# Patient Record
Sex: MALE | URBAN - METROPOLITAN AREA
[De-identification: names, ages, dates, MRNs, and addresses within clinical notes are randomized per-mention and may not be internally consistent; named-entity substitution may affect disease eponyms.]

---

## 2024-09-02 ENCOUNTER — HOSPITAL ENCOUNTER (EMERGENCY)
Facility: HOSPITAL | Age: 54
Discharge: HOME | End: 2024-09-02
Attending: EMERGENCY MEDICINE
Payer: COMMERCIAL

## 2024-09-02 VITALS
RESPIRATION RATE: 16 BRPM | WEIGHT: 165 LBS | HEART RATE: 77 BPM | HEIGHT: 68 IN | SYSTOLIC BLOOD PRESSURE: 131 MMHG | OXYGEN SATURATION: 98 % | DIASTOLIC BLOOD PRESSURE: 73 MMHG | TEMPERATURE: 99 F | BODY MASS INDEX: 25.01 KG/M2

## 2024-09-02 DIAGNOSIS — H61.23 BILATERAL IMPACTED CERUMEN: Primary | ICD-10-CM

## 2024-09-02 PROCEDURE — 25000003 PHARM REV CODE 250: Performed by: NURSE PRACTITIONER

## 2024-09-02 PROCEDURE — 99282 EMERGENCY DEPT VISIT SF MDM: CPT

## 2024-09-02 RX ORDER — DOCUSATE SODIUM 50 MG/5ML
100 LIQUID ORAL DAILY
Status: DISCONTINUED | OUTPATIENT
Start: 2024-09-02 | End: 2024-09-02 | Stop reason: HOSPADM

## 2024-09-02 RX ORDER — DOCUSATE SODIUM 50 MG/5ML
100 LIQUID ORAL
Status: COMPLETED | OUTPATIENT
Start: 2024-09-02 | End: 2024-09-02

## 2024-09-02 RX ADMIN — DOCUSATE SODIUM 100 MG: 50 LIQUID ORAL at 10:09

## 2024-09-02 RX ADMIN — DOCUSATE SODIUM 100 MG: 50 LIQUID ORAL at 12:09

## 2024-09-02 NOTE — ED PROVIDER NOTES
Encounter Date: 9/2/2024       History     Chief Complaint   Patient presents with    Otalgia     Left ear a ringing sound x months     Patient is a 54 y.o. male who presents to the ED 09/02/2024 with a chief complaint of muffled sounds in his left ear for 2 months.  He initially describes it as rain but states it is more like a muffled hearing in the left side.  He states he believes he has ear wax in his ears in his attempted to remove it several times without success at home with over-the-counter methods.  He denies any fever or nasal congestion.  He denies any trauma.  He denies any recent aspirin or other NSAID use.  He states he actually takes no medications in his taking no new medications.  He does not take any daily over-the-counter medications either.  He denies any drug use and has occasional alcohol use with 2-3 beers once a week.  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             Review of patient's allergies indicates:  No Known Allergies  History reviewed. No pertinent past medical history.  History reviewed. No pertinent surgical history.  No family history on file.  Social History     Tobacco Use    Smoking status: Unknown     Review of Systems   Constitutional:  Negative for chills and fever.   HENT:  Positive for hearing loss. Negative for congestion, ear discharge, ear pain, facial swelling and sore throat.    Respiratory:  Negative for chest tightness and shortness of breath.    Cardiovascular:  Negative for chest pain.   Gastrointestinal:  Negative for abdominal pain.   Genitourinary:  Negative for dysuria.   Musculoskeletal:  Negative for arthralgias and myalgias.   Skin:  Negative for rash and wound.   Neurological:  Negative for syncope.   Hematological:  Does not bruise/bleed easily.       Physical Exam     Initial Vitals [09/02/24 1014]   BP Pulse Resp Temp SpO2   131/73 77 16 98.6 °F (37 °C) 98 %      MAP       --          Physical Exam    Nursing note and vitals reviewed.  Constitutional: He appears well-developed and well-nourished.   HENT:   Head: Normocephalic and atraumatic.   Right Ear: External ear normal.   Left Ear: External ear normal.   Nose: Nose normal. Right sinus exhibits no maxillary sinus tenderness and no frontal sinus tenderness. Left sinus exhibits no maxillary sinus tenderness and no frontal sinus tenderness.   Bilateral cerumen impactions. No pain with pinna movement. No mastoid erythema or swelling or tenderness.    Eyes: Conjunctivae are normal. Pupils are equal, round, and reactive to light. Right eye exhibits no discharge. Left eye exhibits no discharge.   Neck: Neck supple.   Normal range of motion.  Cardiovascular:  Normal rate, regular rhythm, normal heart sounds and intact distal pulses.           Pulmonary/Chest: Breath sounds normal.   Musculoskeletal:         General: Normal range of motion.      Cervical back: Normal range of motion and neck supple.     Neurological: He is alert and oriented to person, place, and time. He has normal strength. No sensory deficit.   Skin: Skin is warm and dry. Capillary refill takes less than 2 seconds.   Psychiatric: He has a normal mood and affect.         ED Course   Procedures  Labs Reviewed - No data to display       Imaging Results    None          Medications   docusate 50 mg/5 mL liquid 100 mg (100 mg Otic Given 9/2/24 1058)     Medical Decision Making  Risk  OTC drugs.         APC / Resident Notes:   Patient is a 54 y.o. male who presents to the ED 09/02/2024 who underwent emergent evaluation for muffled hearing on the left with bi Cerumen impactions present on exam.  Colace in ears and partial cerumen impaction removed with improvement in symptoms.  I am able to partially visualize the TM on the left.  There is no evidence of perforation or underlying infection and I do not think otitis media.  I do not think other emergent process such as mastoiditis or  erythema.  No canal swelling or pain with pinna movement bilaterally and I do not think otitis externa.  I believe patient having cerumen impactions and discussed follow up with ENT for further evaluation. I see no indication for antibiotics at this time. Based on my clinical evaluation, I do not appreciate any immediate, emergent, or life threatening condition or etiology that warrants additional workup today and feel that the patient can be discharged with close follow up care. Case discussed with Dr. Guerin who is agreeable to plan of care. Follow up and return precautions discussed; patient verbalized understanding and is agreeable to plan of care. Patient discharged home in stable condition.                      Medical Decision Making:   Differential Diagnosis:   AOM  Cerumen impaction  Adverse medication reaction             Clinical Impression:  Final diagnoses:  [H61.23] Bilateral impacted cerumen (Primary)          ED Disposition Condition    Discharge Stable          ED Prescriptions    None       Follow-up Information       Follow up With Specialties Details Why Contact Info Additional Information    Carl Romero MD Otolaryngology In 3 days  [address] [facility] [address]  [phone]       [facility] Emergency Medicine  If symptoms worsen [address] [place] [address] [address] [place] [address] 1st floor    Zeny Rivera MD Dermatology   [id number] [facility]  [address]  [facility] [address]  [phone]                Olinda Uribe NP  09/02/24 1673

## 2024-09-03 ENCOUNTER — OFFICE VISIT (OUTPATIENT)
Dept: OTOLARYNGOLOGY | Facility: CLINIC | Age: 54
End: 2024-09-03
Payer: COMMERCIAL

## 2024-09-03 VITALS
WEIGHT: 163.56 LBS | BODY MASS INDEX: 24.87 KG/M2 | SYSTOLIC BLOOD PRESSURE: 126 MMHG | HEART RATE: 82 BPM | DIASTOLIC BLOOD PRESSURE: 81 MMHG

## 2024-09-03 DIAGNOSIS — Z75.8 DOES NOT HAVE PRIMARY CARE PROVIDER: ICD-10-CM

## 2024-09-03 DIAGNOSIS — H61.23 BILATERAL IMPACTED CERUMEN: Primary | ICD-10-CM

## 2024-09-03 PROCEDURE — 3079F DIAST BP 80-89 MM HG: CPT | Mod: CPTII,S$GLB,, | Performed by: PHYSICIAN ASSISTANT

## 2024-09-03 PROCEDURE — 69210 REMOVE IMPACTED EAR WAX UNI: CPT | Mod: S$GLB,,, | Performed by: PHYSICIAN ASSISTANT

## 2024-09-03 PROCEDURE — 3008F BODY MASS INDEX DOCD: CPT | Mod: CPTII,S$GLB,, | Performed by: PHYSICIAN ASSISTANT

## 2024-09-03 PROCEDURE — 3074F SYST BP LT 130 MM HG: CPT | Mod: CPTII,S$GLB,, | Performed by: PHYSICIAN ASSISTANT

## 2024-09-03 PROCEDURE — 99203 OFFICE O/P NEW LOW 30 MIN: CPT | Mod: 25,S$GLB,, | Performed by: PHYSICIAN ASSISTANT

## 2024-09-03 PROCEDURE — 1159F MED LIST DOCD IN RCRD: CPT | Mod: CPTII,S$GLB,, | Performed by: PHYSICIAN ASSISTANT

## 2024-09-03 PROCEDURE — 99999 PR PBB SHADOW E&M-EST. PATIENT-LVL III: CPT | Mod: PBBFAC,,, | Performed by: PHYSICIAN ASSISTANT

## 2024-09-03 NOTE — PROCEDURES
Ear Cerumen Removal    Date/Time: 9/3/2024 8:00 AM    Performed by: Norm Bello PA-C  Authorized by: Norm Bello PA-C      Local anesthetic:  None  Location details:  Both ears  Procedure type comment:  Suction, 10 Fr  Cerumen  Removal Results:  Cerumen completely removed  Patient tolerance:  Patient tolerated the procedure well with no immediate complications

## 2024-09-03 NOTE — PROGRESS NOTES
"Subjective:     Rashad Smith is a 54 y.o. male who was referred to me by Olinda Uribe in consultation for hearing loss.    Patient reports developing left more than right muffled hearing over last 1-2 months.  He denies any pain, drainage, tinnitus.  He states he uses Q-tips often to clear the wax and feels like he may have pushed some deeper.  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  Patient denies a history of ear surgery or recurrent ear infections.    Past Medical/Past Surgical History  No past medical history on file.  He has no past surgical history on file.    Family History/Social History  His family history is not on file.  He     Allergies/Immunizations  He has No Known Allergies.    There is no immunization history on file for this patient.     Medications   This patient does not have an active medication from one of the medication groupers.     Review of Systems   HENT: Positive for hearing loss.             Objective:     /81 (BP Location: Right arm, Patient Position: Sitting, BP Method: Large (Automatic))   Pulse 82   Wt 74.2 kg (163 lb 9.3 oz)   BMI 24.87 kg/m²      Physical Exam  Vitals reviewed.   Constitutional:       Appearance: Normal appearance.   HENT:      Head: Normocephalic and atraumatic.      Right Ear: Tympanic membrane, ear canal and external ear normal. There is impacted cerumen.      Left Ear: Tympanic membrane, ear canal and external ear normal. There is impacted cerumen.   Eyes:      Conjunctiva/sclera: Conjunctivae normal.   Pulmonary:      Effort: Pulmonary effort is normal.   Neurological:      Mental Status: He is alert.         Procedure    Cerumen removal performed.  See procedure note.    Data Reviewed    I reviewed the following notes  Emergency medicine     No results found for: "WBC"  No results found for: "PLT"   No results found for: "CREATININE"  No results found for: "TSH"  No results found for: "GLU"  No results " "found for: "HGBA1C"     Assessment & Plan:     1. Bilateral impacted cerumen  -     Ambulatory referral/consult to ENT  -     Ear Cerumen Removal    2. Does not have primary care provider  -     Ambulatory referral/consult to Internal Medicine; Future; Expected date: 09/10/2024      He will follow up as needed  I had a discussion with the patient regarding his condition and the further workup and management options.    All questions were answered, and the patient is in agreement with the above.   "